# Patient Record
Sex: MALE | Race: WHITE | URBAN - METROPOLITAN AREA
[De-identification: names, ages, dates, MRNs, and addresses within clinical notes are randomized per-mention and may not be internally consistent; named-entity substitution may affect disease eponyms.]

---

## 2020-09-28 ENCOUNTER — HISTORICAL (OUTPATIENT)
Dept: ADMINISTRATIVE | Facility: HOSPITAL | Age: 45
End: 2020-09-28

## 2020-10-02 ENCOUNTER — HISTORICAL (OUTPATIENT)
Dept: HEPATOLOGY | Facility: HOSPITAL | Age: 45
End: 2020-10-02

## 2020-10-06 ENCOUNTER — HISTORICAL (OUTPATIENT)
Dept: ADMINISTRATIVE | Facility: HOSPITAL | Age: 45
End: 2020-10-06

## 2020-11-23 ENCOUNTER — HISTORICAL (OUTPATIENT)
Dept: SURGERY | Facility: HOSPITAL | Age: 45
End: 2020-11-23

## 2020-11-30 ENCOUNTER — HISTORICAL (OUTPATIENT)
Dept: RADIOLOGY | Facility: HOSPITAL | Age: 45
End: 2020-11-30

## 2020-12-22 ENCOUNTER — HISTORICAL (OUTPATIENT)
Dept: RADIOLOGY | Facility: HOSPITAL | Age: 45
End: 2020-12-22

## 2021-01-13 ENCOUNTER — HISTORICAL (OUTPATIENT)
Dept: RADIOLOGY | Facility: HOSPITAL | Age: 46
End: 2021-01-13

## 2021-01-20 ENCOUNTER — HISTORICAL (OUTPATIENT)
Dept: CARDIOLOGY | Facility: HOSPITAL | Age: 46
End: 2021-01-20

## 2021-02-01 LAB
ABS NEUT (OLG): 4.53 X10(3)/MCL (ref 2.1–9.2)
BASOPHILS # BLD AUTO: 0.1 X10(3)/MCL (ref 0–0.2)
BASOPHILS NFR BLD AUTO: 1 %
BUN SERPL-MCNC: 14.8 MG/DL (ref 8.9–20.6)
CALCIUM SERPL-MCNC: 9.1 MG/DL (ref 8.4–10.2)
CHLORIDE SERPL-SCNC: 103 MMOL/L (ref 98–107)
CO2 SERPL-SCNC: 27 MMOL/L (ref 22–29)
CREAT SERPL-MCNC: 0.82 MG/DL (ref 0.73–1.18)
CREAT/UREA NIT SERPL: 18
EOSINOPHIL # BLD AUTO: 0.3 X10(3)/MCL (ref 0–0.9)
EOSINOPHIL NFR BLD AUTO: 4 %
ERYTHROCYTE [DISTWIDTH] IN BLOOD BY AUTOMATED COUNT: 12.3 % (ref 11.5–17)
GLUCOSE SERPL-MCNC: 89 MG/DL (ref 74–100)
HCT VFR BLD AUTO: 44.3 % (ref 42–52)
HGB BLD-MCNC: 15 GM/DL (ref 14–18)
LYMPHOCYTES # BLD AUTO: 1.1 X10(3)/MCL (ref 0.6–4.6)
LYMPHOCYTES NFR BLD AUTO: 17 %
MCH RBC QN AUTO: 30.9 PG (ref 27–31)
MCHC RBC AUTO-ENTMCNC: 33.9 GM/DL (ref 33–36)
MCV RBC AUTO: 91.3 FL (ref 80–94)
MONOCYTES # BLD AUTO: 0.6 X10(3)/MCL (ref 0.1–1.3)
MONOCYTES NFR BLD AUTO: 10 %
NEUTROPHILS # BLD AUTO: 4.53 X10(3)/MCL (ref 2.1–9.2)
NEUTROPHILS NFR BLD AUTO: 68 %
PLATELET # BLD AUTO: 237 X10(3)/MCL (ref 130–400)
PMV BLD AUTO: 10.8 FL (ref 9.4–12.4)
POTASSIUM SERPL-SCNC: 4.9 MMOL/L (ref 3.5–5.1)
RBC # BLD AUTO: 4.85 X10(6)/MCL (ref 4.7–6.1)
SODIUM SERPL-SCNC: 140 MMOL/L (ref 136–145)
WBC # SPEC AUTO: 6.7 X10(3)/MCL (ref 4.5–11.5)

## 2021-02-04 ENCOUNTER — HISTORICAL (OUTPATIENT)
Dept: ADMINISTRATIVE | Facility: HOSPITAL | Age: 46
End: 2021-02-04

## 2021-02-04 LAB — EST CREAT CLEARANCE SER (OHS): 135.11 ML/MIN

## 2022-04-10 ENCOUNTER — HISTORICAL (OUTPATIENT)
Dept: ADMINISTRATIVE | Facility: HOSPITAL | Age: 47
End: 2022-04-10

## 2022-04-26 VITALS
WEIGHT: 286.63 LBS | BODY MASS INDEX: 36.78 KG/M2 | DIASTOLIC BLOOD PRESSURE: 74 MMHG | SYSTOLIC BLOOD PRESSURE: 112 MMHG | HEIGHT: 74 IN

## 2022-05-03 NOTE — HISTORICAL [facility]ER
This is a historical note converted from Miguel. Formatting and pictures may have been removed.  Please reference Miguel for original formatting and attached multimedia. Chief Complaint  pt here for left shoulder pain started after seeing a chiropractor for his neck, x-ray today...cv  History of Present Illness  Patient comes in today for evaluation of his left arm?and shoulder area.  He reports?approximately 4 weeks ago he woke up with a crick in his neck and is quite severe in the left side.  He tried treating himself some over-the-counter medications and just rest and time but the pain did not subside.? He went to a chiropractor?approximately 2 weeks ago?who did an adjustment already?popped his neck and he has some almost immediate relief of his neck pain. ?Patient states he had no?x-rays or other imaging of his left shoulder or neck at that time  The patient?reports approximately a?day and a half later he started having?weakness in his left?arm and shoulder area.  Difficulty raising the arm forward and out to the side.  He states pain is not a real issue in the shoulder or arm area but is is the profound weakness that he has along with some numbness to about the area of the elbow  ?  He states he did have a previous right shoulder injury for which she had shoulder surgery on he states the weakness he is having now is very reminiscent of his right shoulder?issue that he had in college.  ?  ?  Review of Systems  Constitutional: No fever, No chills.  Respiratory: No shortness of breath, No cough.  Cardiovascular: No chest pain.  Gastrointestinal: No nausea, No vomiting, No diarrhea, No constipation, No heartburn.  Genitourinary: No dysuria, No hematuria.  Hematology/Lymphatics: No bleeding tendency.  Endocrine: No polyuria.  Neurologic: Alert and oriented X4,  Psychiatric: No anxiety, No depression.  Integumentary:? negative except as documented in history of present illness  Physical Exam  Vitals &  Measurements  T:?97.7? ?F (Oral)? HR:?82(Peripheral)? BP:?135/86?  HT:?189.00?cm? WT:?132.900?kg? BMI:?37.21?  Shoulder Exam:?Left  No obvious deformity.  No medial or lateral scapula winging.  Forward flexion to 80 degrees  Abduction to 60 degrees  External rotation and internal rotation is within normal limits with passive range of motion.  Negative empty can, Whipple, Hernandez, impingement  No AC joint tenderness.  Negative biceps groove tenderness  Negative Apprehension and Relocation test.  2/5 strength with abduction ,flexion and external rotation?  normal skin appearance and palpable pulses distally.  5 out of 5?bicep and triceps strength  Patient has normal strength and range of motion of the wrist and fingers  he does have tenderness palpation of the base the cervical spine and the trapezius area on the left side  Negative Tinel at the wrist  ?  Left shoulder x-rays taken today  There is no acute bony abnormalities noted  Shoulder is well-seated and?no subluxation noted  Patient does have some some cystic changes over the greater tuberosity area  ?  Cervical x-rays taken today  Patient does have some disc pathology at C5-6 with disc space narrowing and some?osteophyte formation  ?  Told the patient at this point I would discuss his case with Dr. Griffith and determine a more definitive plan for when he returns back from his business trip?for his left arm weakness  Assessment/Plan  1.?Left arm weakness?R29.228   Problem List/Past Medical History  Ongoing  Obesity  Historical  No qualifying data  Medications  Aleve 220 mg oral tablet, 1 cap(s), Oral, q8hr, PRN  Allergies  No Known Medication Allergies  Social History  Abuse/Neglect  No, 09/28/2020  Alcohol  Current, Beer, 1-2 times per month, 09/28/2020  Tobacco  Never (less than 100 in lifetime), N/A, 09/28/2020  Health Maintenance  Health Maintenance  ???Pending?(in the next year)  ??? ??Due?  ??? ? ? ?ADL Screening due??09/29/20??and every 1??year(s)  ??? ?  ? ?Influenza Vaccine due??09/29/20??and every?  ??? ? ? ?Lipid Screening due??09/29/20??and every?  ??? ? ? ?Tetanus Vaccine due??09/29/20??and every 10??year(s)  ??? ??Due In Future?  ??? ? ? ?Obesity Screening not due until??01/01/21??and every 1??year(s)  ??? ? ? ?Alcohol Misuse Screening not due until??01/02/21??and every 1??year(s)  ??? ? ? ?Blood Pressure Screening not due until??09/28/21??and every 1??year(s)  ??? ? ? ?Body Mass Index Check not due until??09/28/21??and every 1??year(s)  ??? ? ? ?Depression Screening not due until??09/28/21??and every 1??year(s)  ???Satisfied?(in the past 1 year)  ??? ??Satisfied?  ??? ? ? ?Alcohol Misuse Screening on??09/28/20.??Satisfied by Chelsea Christine LPN  ??? ? ? ?Blood Pressure Screening on??09/28/20.??Satisfied by Chelsea Christine LPN  ??? ? ? ?Body Mass Index Check on??09/28/20.??Satisfied by Chelsea Christine LPN  ??? ? ? ?Depression Screening on??09/28/20.??Satisfied by Chelsea Christine LPN  ??? ? ? ?Obesity Screening on??09/28/20.??Satisfied by Chelsea Christine LPN  ?      After discussing with Dr. Griffith the patients left arm weakness we will obtain MRI of his cervical spine prior to his appointment next week with Dr. Griffith

## 2022-05-03 NOTE — HISTORICAL [facility]
This is a historical note converted from Miguel. Formatting and pictures may have been removed.  Please reference Miguel for original formatting and attached multimedia. Chief Complaint  Pt here today for left shoulder and neck pain, Cervical MRI in chart, also has c/o left hip pain...cv  History of Present Illness  still having same c/o weakness of the left arm  no change in symptoms from last week  had a cervical MRI  ?  states he is having left hip/groin pain  Review of Systems  Review of Systems?  ?????Constitutional: ?No fever, No chills. ?  ?????Respiratory: ?No shortness of breath, No cough. ?  ?????Cardiovascular: ?No chest pain. ?  ?????Gastrointestinal: ?No nausea, No vomiting, No diarrhea, No constipation, No heartburn. ?  ?????Genitourinary: ?No dysuria, No hematuria. ?  ?????Hematology/Lymphatics: ?No bleeding tendency. ?  ?????Endocrine: ?No polyuria. ?  ?????Neurologic: ?Alert and oriented X4, No numbness, No tingling. ?  ?????Psychiatric: ?No anxiety, No depression. ?  ?????Integumentary: no abnormalities except as noted in history of present illness  Physical Exam  Vitals & Measurements  T:?97.7? ?F (Oral)? HR:?74(Peripheral)? BP:?136/87?  HT:?189.00?cm? WT:?132.900?kg? BMI:?37.21?  ?  ?  ?  ?  ?  ?  ?  ?  Musculoskeletal System:  This patient has some pain with rotation of his neck and with flexion extension.  Lamination of his left shoulder he has atrophy of his deltoid he is weakness in abduction of the shoulder?he has weakness in external rotation of the shoulder. ?His sensory examination is normal is over his hand he has a negative Tinel at the elbow and wrist he has good pulse.  ?  ?   Pelvis:  General/bilateral:  NO?? Tenderness on palpation of the iliac crest.  No ?? Tenderness on palpation of the anterior superior iliac spine.  No ?? Tenderness on palpation of the posterior superior iliac spine.  No ?? Pubic symphysis showed tenderness on palpation.  No ?? Tenderness on palpation of the  sacroiliac joint. ?? No tenderness on palpation of the pelvic girdle.  Hips:  Hip: ?? No swelling.  ?? Greater trochanter did not show tenderness on palpation.  ?? Hip was not tender on palpation.  ?? Motion was abnormal.  ?? pain was elicited by active internal rotation with the hip flexed.  ?? pain was elicited by active external rotation with the hip flexed.  ?? pain was elicited by active motion.  ?? pain was elicited by passive motion.  ?? Hip was not dislocated.  ?? No instability was noted.  Neurological:  Motor (Strength): ?? No weakness of the hip was observed.  Gait And Stance: ?? No limping was noted. ?? No antalgic gait was observed.  Skin:  ?? No scars hip.  ???  2+ dorsalis pedis pulses  This patients had plain films of the cervical spine which show changes at C5-6?and his?MRI ?scan shows evidence of?stenosis C5-6 with neuroforaminal involvement.  Since this patient has significant neck pain and physical findings?I would like to him to be evaluated by neurosurgeon.??I was also concerned that he may have a brachial plexopathy.  Assessment/Plan  Left hip pain?M25.552  Ordered:  XR Hip Left 2 Views, Routine, 10/06/20 8:19:00 CDT, None, Ambulatory, Rad Type, Left hip pain, Not Scheduled, 10/06/20 8:19:00 CDT  ?   Problem List/Past Medical History  Ongoing  Obesity  Historical  No qualifying data  Medications  Aleve 220 mg oral tablet, 1 cap(s), Oral, q8hr, PRN  Allergies  No Known Medication Allergies  Social History  Abuse/Neglect  No, 10/06/2020  Alcohol  Current, Beer, 1-2 times per month, 09/28/2020  Tobacco  Never (less than 100 in lifetime), N/A, 10/06/2020  Health Maintenance  Health Maintenance  ???Pending?(in the next year)  ??? ??OverDue  ??? ? ? ?Influenza Vaccine due??10/01/19??and every 1??day(s)  ??? ??Due?  ??? ? ? ?ADL Screening due??10/06/20??and every 1??year(s)  ??? ? ? ?Lipid Screening due??10/06/20??and every?  ??? ? ? ?Tetanus Vaccine due??10/06/20??and every 10??year(s)  ??? ??Due In  Future?  ??? ? ? ?Obesity Screening not due until??01/01/21??and every 1??year(s)  ??? ? ? ?Alcohol Misuse Screening not due until??01/02/21??and every 1??year(s)  ??? ? ? ?Depression Screening not due until??09/28/21??and every 1??year(s)  ???Satisfied?(in the past 1 year)  ??? ??Satisfied?  ??? ? ? ?Alcohol Misuse Screening on??09/28/20.??Satisfied by Chelsea Christine LPN  ??? ? ? ?Blood Pressure Screening on??10/06/20.??Satisfied by Chelsea Christine LPN  ??? ? ? ?Body Mass Index Check on??10/06/20.??Satisfied by Chelsea Christine LPN  ??? ? ? ?Depression Screening on??09/28/20.??Satisfied by Chelsea Christine LPN  ??? ? ? ?Obesity Screening on??10/06/20.??Satisfied by Chelsea Christine LPN  ?      Diagnosis:?Cervical radiculopathy  Left hip osteoarthritis  ?  Plan: As above